# Patient Record
Sex: MALE | Race: BLACK OR AFRICAN AMERICAN | Employment: FULL TIME | URBAN - METROPOLITAN AREA
[De-identification: names, ages, dates, MRNs, and addresses within clinical notes are randomized per-mention and may not be internally consistent; named-entity substitution may affect disease eponyms.]

---

## 2023-11-20 ENCOUNTER — HOSPITAL ENCOUNTER (EMERGENCY)
Age: 28
Discharge: HOME | End: 2023-11-20
Attending: EMERGENCY MEDICINE
Payer: COMMERCIAL

## 2023-11-20 VITALS
HEART RATE: 63 BPM | DIASTOLIC BLOOD PRESSURE: 76 MMHG | SYSTOLIC BLOOD PRESSURE: 122 MMHG | RESPIRATION RATE: 16 BRPM | BODY MASS INDEX: 24.25 KG/M2 | OXYGEN SATURATION: 100 % | HEIGHT: 75 IN | WEIGHT: 195 LBS | TEMPERATURE: 98.2 F

## 2023-11-20 DIAGNOSIS — F44.5 PSYCHOGENIC NONEPILEPTIC SEIZURE: ICD-10-CM

## 2023-11-20 DIAGNOSIS — F43.21 GRIEF REACTION: Primary | ICD-10-CM

## 2023-11-20 PROCEDURE — 99283 EMERGENCY DEPT VISIT LOW MDM: CPT

## 2023-11-20 PROCEDURE — 93005 ELECTROCARDIOGRAM TRACING: CPT | Performed by: EMERGENCY MEDICINE

## 2023-11-20 PROCEDURE — 6370000000 HC RX 637 (ALT 250 FOR IP): Performed by: EMERGENCY MEDICINE

## 2023-11-20 RX ORDER — CLONAZEPAM 1 MG/1
0.5 TABLET ORAL
Status: COMPLETED | OUTPATIENT
Start: 2023-11-20 | End: 2023-11-20

## 2023-11-20 RX ORDER — CLONAZEPAM 0.5 MG/1
0.5 TABLET ORAL 2 TIMES DAILY PRN
Qty: 14 TABLET | Refills: 0 | Status: SHIPPED | OUTPATIENT
Start: 2023-11-20 | End: 2023-11-27

## 2023-11-20 RX ORDER — ESCITALOPRAM OXALATE 10 MG/1
10 TABLET ORAL DAILY
Qty: 30 TABLET | Refills: 0 | Status: SHIPPED | OUTPATIENT
Start: 2023-11-20

## 2023-11-20 RX ADMIN — CLONAZEPAM 0.5 MG: 1 TABLET ORAL at 15:04

## 2023-11-20 ASSESSMENT — ENCOUNTER SYMPTOMS
NAUSEA: 0
DIARRHEA: 0
RHINORRHEA: 0
VOMITING: 0
COLOR CHANGE: 0
ABDOMINAL PAIN: 0
SHORTNESS OF BREATH: 0
COUGH: 0
BACK PAIN: 0

## 2023-11-20 ASSESSMENT — PAIN - FUNCTIONAL ASSESSMENT: PAIN_FUNCTIONAL_ASSESSMENT: 0-10

## 2023-11-20 ASSESSMENT — PAIN SCALES - GENERAL: PAINLEVEL_OUTOF10: 10

## 2023-11-20 ASSESSMENT — PAIN DESCRIPTION - LOCATION: LOCATION: ABDOMEN

## 2023-11-20 ASSESSMENT — PAIN DESCRIPTION - ORIENTATION: ORIENTATION: UPPER

## 2023-11-20 NOTE — ED PROVIDER NOTES
Emergency Department Provider Note       PCP: No primary care provider on file. Age: 29 y.o. Sex: male     DISPOSITION Discharge - Pending Orders Complete 11/20/2023 01:54:58 PM       ICD-10-CM    1. Grief reaction  F43.21 clonazePAM (KLONOPIN) 0.5 MG tablet      2. Psychogenic nonepileptic seizure  F44.5 clonazePAM (KLONOPIN) 0.5 MG tablet     [facility] - Jovanni Gongora MD, Psychiatry, [facility] Decision Making     Complexity of Problems Addressed:  1 or more chronic illnesses with a severe exacerbation or progression. Data Reviewed and Analyzed:  I independently ordered and reviewed each unique test.  I reviewed external records: provider visit note from outside specialist.   EMS provides history of pseudoseizure like behavior        Discussion of management or test interpretation. Patient had a pseudoseizure and panic-like attack symptoms in the ED lobby. At the time of my evaluation he is calm down and improved. He has a nonfocal neurologic exam and stable vital signs. Review of records reveals symptoms concerning for pseudoseizures and patient admits to recently being diagnosed with these. He reports a recent normal MRI and EEG as well. I could not find these results. Patient continues on Keppra 500 mg twice daily. I will start patient on Lexapro as well as Klonopin as needed for anxiety for 1 week. Referral placed for [facility]. Patient is not homicidal or suicidal.      Risk of Complications and/or Morbidity of Patient Management:  Prescription drug management performed. Shared medical decision making was utilized in creating the patients health plan today. History       Patient presents complaining of repeated seizures over the last few days. He reports the loss of a child 2 days ago. Patient had episode of tearfulness and pseudoseizure in the ED lobby. Review of Systems   Constitutional:  Negative for chills and fever.    HENT:  Negative for

## 2023-11-20 NOTE — DISCHARGE INSTRUCTIONS
Medications as prescribed. Call the number below for primary care establishment. Follow-up with [facility] [place] as [place]. Call tomorrow for appointment time and instructions.

## 2023-11-20 NOTE — ED TRIAGE NOTES
Patient arrives to ED pov from [facility] urgent Care. Patient reports abdominal pain for the last few days. Patient also reports seizures. Patient has history of seizures. Patient reports he takes medication for his seizures and reports he is compliant.  Patient reports n/v/d.

## 2023-11-21 LAB
EKG ATRIAL RATE: 89 BPM
EKG DIAGNOSIS: NORMAL
EKG P AXIS: 75 DEGREES
EKG P-R INTERVAL: 128 MS
EKG Q-T INTERVAL: 366 MS
EKG QRS DURATION: 100 MS
EKG QTC CALCULATION (BAZETT): 445 MS
EKG R AXIS: 88 DEGREES
EKG T AXIS: 62 DEGREES
EKG VENTRICULAR RATE: 89 BPM

## 2023-11-21 PROCEDURE — 93010 ELECTROCARDIOGRAM REPORT: CPT | Performed by: INTERNAL MEDICINE
